# Patient Record
Sex: FEMALE | Race: WHITE | Employment: UNEMPLOYED | URBAN - METROPOLITAN AREA
[De-identification: names, ages, dates, MRNs, and addresses within clinical notes are randomized per-mention and may not be internally consistent; named-entity substitution may affect disease eponyms.]

---

## 2021-03-30 ENCOUNTER — HOSPITAL ENCOUNTER (EMERGENCY)
Age: 31
Discharge: HOME | End: 2021-03-30
Attending: EMERGENCY MEDICINE

## 2021-03-30 VITALS
HEART RATE: 69 BPM | BODY MASS INDEX: 29.44 KG/M2 | DIASTOLIC BLOOD PRESSURE: 82 MMHG | SYSTOLIC BLOOD PRESSURE: 117 MMHG | HEIGHT: 62 IN | TEMPERATURE: 98.3 F | RESPIRATION RATE: 17 BRPM | WEIGHT: 160 LBS | OXYGEN SATURATION: 99 %

## 2021-03-30 DIAGNOSIS — R20.2 PARESTHESIA OF LEFT UPPER EXTREMITY: Primary | ICD-10-CM

## 2021-03-30 LAB
ANION GAP SERPL CALCULATED.3IONS-SCNC: 12 MMOL/L (ref 3–16)
BUN BLDV-MCNC: 14 MG/DL (ref 7–20)
CALCIUM SERPL-MCNC: 9.7 MG/DL (ref 8.3–10.6)
CHLORIDE BLD-SCNC: 96 MMOL/L (ref 99–110)
CO2: 26 MMOL/L (ref 21–32)
CREAT SERPL-MCNC: 0.6 MG/DL (ref 0.6–1.1)
EKG ATRIAL RATE: 66 BPM
EKG DIAGNOSIS: NORMAL
EKG P AXIS: 36 DEGREES
EKG P-R INTERVAL: 138 MS
EKG Q-T INTERVAL: 438 MS
EKG QRS DURATION: 82 MS
EKG QTC CALCULATION (BAZETT): 459 MS
EKG R AXIS: 23 DEGREES
EKG T AXIS: 25 DEGREES
EKG VENTRICULAR RATE: 66 BPM
GFR AFRICAN AMERICAN: >60
GFR NON-AFRICAN AMERICAN: >60
GLUCOSE BLD-MCNC: 98 MG/DL (ref 70–99)
POTASSIUM REFLEX MAGNESIUM: 4.2 MMOL/L (ref 3.5–5.1)
SODIUM BLD-SCNC: 134 MMOL/L (ref 136–145)
TROPONIN: <0.01 NG/ML

## 2021-03-30 PROCEDURE — 99283 EMERGENCY DEPT VISIT LOW MDM: CPT

## 2021-03-30 PROCEDURE — 84484 ASSAY OF TROPONIN QUANT: CPT

## 2021-03-30 PROCEDURE — 93005 ELECTROCARDIOGRAM TRACING: CPT | Performed by: COUNSELOR

## 2021-03-30 PROCEDURE — 80048 BASIC METABOLIC PNL TOTAL CA: CPT

## 2021-03-30 NOTE — ED TRIAGE NOTES
Patient reports being at work when her left arm started burning.  +dizziness  +blurred vision.   Patient states \"I am very anxious after this episode\"

## 2021-03-30 NOTE — ED PROVIDER NOTES
ED Attending Attestation Note     Date of evaluation: 3/30/2021    This patient was seen by the resident. I have seen and examined the patient, agree with the workup, evaluation, management and diagnosis. The care plan has been discussed. My assessment reveals patient with transient left arm pain with lightheadedness and feeling her heart beat hard, now resolved. Anxious about symptoms and concerned it could represent heart attack. Workup including ECG and labs normal.  Will reassure and have her followup with PCP.      Matheus Colón MD  03/30/21 0427

## 2021-03-30 NOTE — ED NOTES
Patient discharged to home via family. Written discharge instructions reviewed with understanding. Copy of AVS sent home with patient. Patient able to walk from ED without assistance.        Brittany Milligan RN  03/30/21 0163

## 2021-05-04 NOTE — ED PROVIDER NOTES
Palpations: Abdomen is soft. Skin:     General: Skin is warm and dry. Findings: No bruising, erythema or rash. Diagnostic Results     EKG     Normal sinus rhythm    RADIOLOGY:  No orders to display       LABS:   Results for orders placed or performed during the hospital encounter of 94/08/60   Basic Metabolic Panel w/ Reflex to MG   Result Value Ref Range    Sodium 134 (L) 136 - 145 mmol/L    Potassium reflex Magnesium 4.2 3.5 - 5.1 mmol/L    Chloride 96 (L) 99 - 110 mmol/L    CO2 26 21 - 32 mmol/L    Anion Gap 12 3 - 16    Glucose 98 70 - 99 mg/dL    BUN 14 7 - 20 mg/dL    CREATININE 0.6 0.6 - 1.1 mg/dL    GFR Non-African American >60 >60    GFR African American >60 >60    Calcium 9.7 8.3 - 10.6 mg/dL   Troponin (lab)   Result Value Ref Range    Troponin <0.01 <0.01 ng/mL   EKG 12 Lead   Result Value Ref Range    Ventricular Rate 66 BPM    Atrial Rate 66 BPM    P-R Interval 138 ms    QRS Duration 82 ms    Q-T Interval 438 ms    QTc Calculation (Bazett) 459 ms    P Axis 36 degrees    R Axis 23 degrees    T Axis 25 degrees    Diagnosis       EKG performed in ER and to be interpreted by ER physician. Confirmed by MD, ER ([id number]),  Geronimo Jha ([phone]) on 3/30/2021 6:49:22 PM       ED BEDSIDE ULTRASOUND:      RECENT VITALS:  BP: 117/82, Temp: 98.3 °F (36.8 °C),Pulse: 69, Resp: 17, SpO2: 99 %     Procedures         ED Course     Nursing Notes, Past Medical Hx, Past Surgical Hx, Social Hx, Allergies, and FamilyHx were reviewed. This is a 31 yo woman presenting for sudden onset burning sensation along her left upper extremity which has since resolved. The patient was giventhe following medications:  No orders of the defined types were placed in this encounter. CONSULTS:  None    MEDICAL DECISION MAKING / ASSESSMENT / PLAN     Benjamin Shaw is a 32 y.o. female presenting for sudden onset paresthesias in left upper arm that has since resolved.   Lightheadedness and palpitations upon standing are thought to be orthostatic hypotension / vagal response as she notes being very alarmed after onset of symptoms. My exam reveals a healthy appearing young woman with no extremity weakness, numbness, or rash. Vital signs show normal heart rate, rr and bp. EKG revealed normal sinus rhythm and troponins negative. She was discharged home after . This patient was also evaluated by the attending physician. All care plans were discussed and agreed upon. Clinical Impression     1. Paresthesia of left upper extremity        Disposition     PATIENT REFERRED TO:  No follow-up provider specified.     DISCHARGE MEDICATIONS:  Discharge Medication List as of 3/30/2021  7:39 PM          DISPOSITION Decision To Discharge 03/30/2021 07:27:44 PM     Selam Cheng MD  Resident  05/04/21 [id number]